# Patient Record
Sex: MALE | Race: BLACK OR AFRICAN AMERICAN
[De-identification: names, ages, dates, MRNs, and addresses within clinical notes are randomized per-mention and may not be internally consistent; named-entity substitution may affect disease eponyms.]

---

## 2019-04-04 NOTE — NM
EXAMINATION TYPE: NM thyroid image w uptake

 

DATE OF EXAM: 4/4/2019

 

COMPARISON: None

 

HISTORY: Hyperthyroidism

 

TECHNIQUE:  Thyroid iodine uptake is calculated and images performed after the oral administration of
 292 uCi 1-123 Capsule.

 

FINDINGS: There is normal distribution of activity throughout the gland.  The 4 hour iodine uptake is
 calculated at 33.5% (normal range 8-14%). The 24-hour iodine uptake is calculated at 55.7% (normal r
pooja 15-35%).  

 

There is increased thyroid uptake.

 

IMPRESSION: Findings compatible with thyroiditis

## 2021-04-15 ENCOUNTER — HOSPITAL ENCOUNTER (OUTPATIENT)
Dept: HOSPITAL 47 - ORWHC2ENDO | Age: 56
Discharge: HOME | End: 2021-04-15
Attending: SURGERY
Payer: COMMERCIAL

## 2021-04-15 VITALS — HEART RATE: 65 BPM | DIASTOLIC BLOOD PRESSURE: 68 MMHG | SYSTOLIC BLOOD PRESSURE: 114 MMHG

## 2021-04-15 VITALS — BODY MASS INDEX: 28.7 KG/M2

## 2021-04-15 VITALS — RESPIRATION RATE: 18 BRPM

## 2021-04-15 VITALS — TEMPERATURE: 98.3 F

## 2021-04-15 DIAGNOSIS — I10: ICD-10-CM

## 2021-04-15 DIAGNOSIS — K64.8: ICD-10-CM

## 2021-04-15 DIAGNOSIS — K64.4: ICD-10-CM

## 2021-04-15 DIAGNOSIS — E78.5: ICD-10-CM

## 2021-04-15 DIAGNOSIS — Z79.899: ICD-10-CM

## 2021-04-15 DIAGNOSIS — E07.9: ICD-10-CM

## 2021-04-15 DIAGNOSIS — Z79.82: ICD-10-CM

## 2021-04-15 DIAGNOSIS — K57.30: Primary | ICD-10-CM

## 2021-04-15 PROCEDURE — 45378 DIAGNOSTIC COLONOSCOPY: CPT

## 2021-04-15 NOTE — P.GSHP
History of Present Illness


H&P Date: 04/15/21


Chief Complaint: GI bleed





Is a 55-year-old male presents today for colonoscopy.  He has had issues with GI

bleed.





Past Medical History


Past Medical History: GI Bleed, Hyperlipidemia, Hypertension, Thyroid Disorder


Additional Past Medical History / Comment(s): diverticulitis


History of Any Multi-Drug Resistant Organisms: None Reported


Past Surgical History: No Surgical Hx Reported


Additional Past Surgical History / Comment(s): colonoscopy.  wisdom teeth, root 

canal


Past Anesthesia/Blood Transfusion Reactions: No Reported Reaction, Motion 

Sickness


Smoking Status: Never smoker





Medications and Allergies


                                Home Medications











 Medication  Instructions  Recorded  Confirmed  Type


 


Aspirin [Adult Low Dose Aspirin EC] 81 mg PO DAILY 04/13/21 04/15/21 History


 


Atorvastatin [Lipitor] 10 mg PO DAILY 04/13/21 04/15/21 History


 


Cinnamon Bark [Cinnamon] 1,000 mg PO DAILY 04/13/21 04/15/21 History


 


Ergocalciferol [Vitamin D2 (1250 1,250 mcg PO Q30D 04/13/21 04/15/21 History





Mcg = 44980 Iu)]    


 


Multivitamins, Thera [Multivitamin 1 tab PO DAILY 04/13/21 04/15/21 History





(formulary)]    


 


Verapamil HCl [Verapamil ER] 240 mg PO BID 04/13/21 04/15/21 History


 


lisinopriL 20 mg PO DAILY 04/13/21 04/15/21 History


 


methIMAzole [Methimazole] 10 - 20 mg PO DAILY 04/13/21 04/15/21 History








                                    Allergies











Allergy/AdvReac Type Severity Reaction Status Date / Time


 


No Known Allergies Allergy   Verified 04/15/21 09:47














Surgical - Exam


                                   Vital Signs











Temp Pulse Resp BP Pulse Ox


 


 98.3 F   79   16   153/88   97 


 


 04/15/21 09:41  04/15/21 09:41  04/15/21 09:41  04/15/21 09:41  04/15/21 09:41














- General


well developed, well nourished, no distress





- Eyes


PERRL





- ENT


normal pinna





- Neck


no masses





- Respiratory


normal expansion





- Cardiovascular


Rhythm: regular





- Abdomen


Abdomen: soft, non tender





Assessment and Plan


Assessment: 


GI bleed.  We'll perform colonoscopy.

## 2021-04-15 NOTE — P.OP
Date of Procedure: 04/15/21


Preoperative Diagnosis: 


GI bleed


Postoperative Diagnosis: 


Internal and external hemorrhoids





Diverticulosis


Procedure(s) Performed: 


Colonoscopy


Anesthesia: MAC


Surgeon: Deandre Garcia


Pathology: none sent


Condition: stable


Disposition: PACU


Description of Procedure: 


The patient's placed on the endoscopy table in the lateral position.  He 

received IV sedation.  Digital rectal exam was performed which revealed external

and internal hemorrhoids.  The flexible colonoscope was then placed patient anus

and passed throughout the entire colon.  The ileocecal valve was visualized.  

The cecum, ascending and transverse colon appeared normal.  In the descending 

and sigmoid colon there was moderate diverticulosis.  Scope was then brought 

back the rectum and this appeared normal.  Scope was withdrawn anus and internal

and external hemorrhoids were noted.  There is no evidence of GI bleed.  It is 

presumed that his rectal bleeding was due to hemorrhoids.

## 2021-05-06 ENCOUNTER — HOSPITAL ENCOUNTER (OUTPATIENT)
Dept: HOSPITAL 47 - SLEEP | Age: 56
End: 2021-05-06
Attending: INTERNAL MEDICINE
Payer: COMMERCIAL

## 2021-05-06 DIAGNOSIS — E05.90: ICD-10-CM

## 2021-05-06 DIAGNOSIS — E78.5: ICD-10-CM

## 2021-05-06 DIAGNOSIS — E66.9: ICD-10-CM

## 2021-05-06 DIAGNOSIS — Z79.899: ICD-10-CM

## 2021-05-06 DIAGNOSIS — G47.33: Primary | ICD-10-CM

## 2021-05-06 DIAGNOSIS — I10: ICD-10-CM

## 2021-05-06 DIAGNOSIS — M47.812: ICD-10-CM

## 2021-05-06 PROCEDURE — 99211 OFF/OP EST MAY X REQ PHY/QHP: CPT

## 2021-05-06 NOTE — CONS
CONSULTATION



DATE OF SERVICE:

05/06/2021



This 55-year-old gentleman has been evaluated in Sleep Center for obstructive sleep

apnea-hypopnea syndrome.



HISTORY OF PRESENT ILLNESS/SLEEP-WAKE EVALUATION:

The patient has a history of obstructive sleep apnea since 2005.  He was diagnosed with

obstructive sleep apnea in another institution.  At present he continues to use his

machine every night for the whole night.  He has occasional episodes of snoring during

sleep.  Otherwise he feels fine.



His sleep schedule is from 9 p.m. to 3 or 4 a.m. and from 10 p.m. to 4 or 5 a.m. on

weekends.  No problems with falling asleep.  No TV in bedroom.  Patient sleeps in

different position, wakes up from sleep up to one time with nocturia.



No history of hypnagogic hallucinations, sleep paralysis or cataplexy.  [place]

Sleepiness Scale is 4, which is normal.



I checked his CPAP unit.  CPAP pressure is 7 cm of water. Usage is 30/30 nights for

more than 4 hours, average 8.9 hours per night.  Leak is 2 L/minute, which is

absolutely perfect.  Apnea-hypopnea index is 4.1, which is normal.



The patient is using medium-sized nasal pillows.



PAST MEDICAL HISTORY:

Positive for hypertension, hyperlipidemia, neck arthritis, hyperthyroidism,



PAST SURGICAL HISTORY:

[place] teeth removed.  Colonoscopy.



MEDICATIONS:

1. Verapamil 240 mg twice a day.

2. Lisinopril 20 mg once a day.

3. Methimazole 10 mg once a day.

4. Atorvastatin 10 mg once a day.

5. Vitamin D supplement.



SOCIAL HISTORY:

Negative for smoking or using alcohol.



FAMILY HISTORY:

Positive for cancer.



REVIEW OF SYSTEMS:

No fevers.  No double vision.  No recent chest pain.  No shortness of breath.  No

abdominal pain.  No bleeding episodes.  No blood in the urine.  No seizure episodes.



Occasional snoring with CPAP.



PHYSICAL EXAMINATION:

GENERAL: A pleasant  gentleman without distress.

VITAL SIGNS: /84, HR 61, RR 12, height 5 feet 9-1/2 inches, weight 220.0,

temperature 97.3, BMI 32.0. Oxygen saturation at room air 98%.

HEENT: PERRLA, EOMI. Evaluation of oropharynx showed tongue protrudes midline. Low

position of soft palate. Mallampati III.

NECK: Supple. No JVD.  Thyroid is not palpable. Neck measures 17-1/2 inches in

circumference.

LUNGS: Clear to percussion and to auscultation.  Good air exchange.  No wheezing or

rhonchi.

HEART: S1, S2 regular.  No murmurs, gallops or rubs.

ABDOMEN:  Soft and nontender.  Bowel sounds are present.  No organomegaly appreciated.

EXTREMITIES:  No clubbing or cyanosis.

CNS: Awake, alert, and oriented X3.  Cranial nerves 2 to 7 intact.  There is no

fasciculation or atrophy. noted.  No focal deficits observed.



IMPRESSION:

1. Obstructive sleep apnea-hypopnea syndrome.  Patient demonstrated 100% compliance

    with treatment.  Normal apnea-hypopnea index reading from CPAP unit.

2. Mild obesity.

3. Hypertension.

4. Hyperlipidemia.

5. Neck arthritis.

6. Hyperthyroidism.



PLAN:

1. I wrote a prescription for all necessary CPAP supplies, including nasal pillow

    mask, tube, filters.

2. Patient should continue to use CPAP equipment every night for the whole night.

3. Watching and losing weight.

4. Sleep hygiene with regular time in bed for 7-1/2 to 8 hours.

5. No driving if feeling any sleepiness.

6. Follow-up visit in 6 months or earlier if patient has any problems.

Thank you very much for allowing me to participate in the management of your patient.



Sincerely,







Mitesh Boyer MD, PhD, FAASM

Diplomat of American Board of Medical Specialties

American Board of Internal Medicine

Medical Director of [facility]





MMODL / MARTINN: [id number] / Job#: [id number]

## 2021-12-09 ENCOUNTER — HOSPITAL ENCOUNTER (OUTPATIENT)
Dept: HOSPITAL 47 - SLEEP | Age: 56
End: 2021-12-09
Attending: INTERNAL MEDICINE
Payer: COMMERCIAL

## 2021-12-09 DIAGNOSIS — G47.33: Primary | ICD-10-CM

## 2021-12-09 DIAGNOSIS — E66.9: ICD-10-CM

## 2021-12-09 DIAGNOSIS — E78.5: ICD-10-CM

## 2021-12-09 DIAGNOSIS — I10: ICD-10-CM

## 2021-12-09 DIAGNOSIS — Z79.899: ICD-10-CM

## 2021-12-09 DIAGNOSIS — E21.3: ICD-10-CM

## 2021-12-09 DIAGNOSIS — Z87.39: ICD-10-CM

## 2021-12-09 DIAGNOSIS — Z99.89: ICD-10-CM

## 2021-12-09 NOTE — SFUN
SLEEP CENTER FOLLOW UP NOTE



DATE OF SERVICE:

12/09/2021



55-year-old gentleman has been followed in Sleep Center for treatment of obstructive

sleep apnea-hypopnea syndrome.  During previous visit, I wrote him a prescription for

all necessary CPAP supplies.  He is getting his supplies in time.  No problems with

that now. Using his machine every night.  He likes machine, likes his mask.  [place]

Sleepiness Scale today is 3.



I checked his CPAP unit. Pressure is 7 cm of water. Usage is 100% of nights more than 4

hours.  Average 9.9 hours per night. Leak is 7 L/minute which is normal range.  Apnea-

hypopnea index is 3.1 which is normal.



MEDICATIONS:

Verapamil 240 mg twice a day, lisinopril 20 mg once a day, atorvastatin 10 mg once a

day, _____10 mg once a day, vitamin D2 supplement.



PHYSICAL EXAMINATION:

GENERAL: Pleasant  gentleman without distress.

/80, HR 72, RR 15, height 5 feet 9-1/2 inches, weight 216.8 pounds, temperature

97, oxygen saturation on room air 98%.

Oropharynx: Low position of soft palate, Mallampati 3.

NECK:  Supple, no JVD.  Thyroid is not palpable.

LUNGS:  Clear to percussion and to auscultation.  Good air exchange.  No wheezing or

rhonchi.

HEART:  S1, S2 regular.  No murmurs, gallops, or rubs.

ABDOMEN:  Soft and nontender.  Bowel sounds are present.  No organomegaly appreciated.

EXTREMITIES: No clubbing or cyanosis.

CNS:  Awake, alert, and oriented X3.  Cranial nerves 2 to 7 intact.  There is no

fasciculation or atrophy. noted.  No focal deficits observed.



IMPRESSION:

1. Obstructive sleep apnea-hypopnea syndrome patient demonstrated great compliance

    with treatment, benefitting from treatment.

2. Hypertension.

3. Mild obesity, BMI 31.7.

4. Hyperlipidemia.

5. History of neck arthritis.

6. Hyperthyroidism.



PLAN:

1. Patient will continue to use PAP equipment every night for the whole night.

2. Sleep hygiene with regular time in bed for at least 7-1/2 to 8 hours.

3. Precautions related to driving. No driving if feeling sleepiness.

4. I will maintain all necessary prescription for PAP supplies including mask, tube,

    filters.

5. Watching weight.

6. Follow-up visit in 6 months or earlier if patient has any problems.



Thank you very much for allowing me to participate in management of your patient.



Sincerely,









Mitesh Boeyr MD, PhD, FAASM

Diplomat of American Board of Medical Specialties

Sleep Medicine Board of American Board of Internal Medicine

Medical Director of [facility]





CORRINE / DAMI: [id number] / Job#: [id number]

## 2023-07-05 ENCOUNTER — HOSPITAL ENCOUNTER (OUTPATIENT)
Dept: HOSPITAL 47 - 3 N SLEEP | Age: 58
End: 2023-07-05
Payer: COMMERCIAL

## 2023-07-05 DIAGNOSIS — Z87.39: ICD-10-CM

## 2023-07-05 DIAGNOSIS — G47.33: Primary | ICD-10-CM

## 2023-07-05 DIAGNOSIS — E03.9: ICD-10-CM

## 2023-07-05 DIAGNOSIS — Z79.899: ICD-10-CM

## 2023-07-05 DIAGNOSIS — E78.5: ICD-10-CM

## 2023-07-05 DIAGNOSIS — E66.9: ICD-10-CM

## 2023-07-05 DIAGNOSIS — Z99.89: ICD-10-CM

## 2023-07-05 DIAGNOSIS — I10: ICD-10-CM

## 2023-07-05 PROCEDURE — 99212 OFFICE O/P EST SF 10 MIN: CPT

## 2023-07-05 NOTE — P.PN
Subjective


DATE: 07/05/2023





FOLLOW UP VISIT.





Patient with obstructive sleep apnea hypopnea syndrome return to sleep center 

for follow-up visit. 


Information from previous visit have been reviewed.  


 Patient is using PAP equipment every night for the whole night, getting PAP 

supplies in time.


The patient does not have significant problems with the mask, PAP unit and 

humidification. [place] sleepiness scale is 4, which is normal.


I checked information from PAP unit. 


PAP unit pressure 7 cm H2O. 


Usage is 100 % for more then 4 hours, average 8.9 hours per night. 


Leak is 17 l/m, which is in acceptable range. 


Apnea Hypopnea Index is 3.3, which is normal.  





MEDICATIONS:1..  Verapamil 240 mg twice a day


                          2.  Lisinopril 20 mg once a day


                          3.  Methimazole 10 mg once a day


                          4.  Atorvastatin 10 mg once a day


                          


During physical exam:


GENERAL: A pleasant patient without any distress. 


VITAL SIGNS: /76, HR 89, RR16 , weight 216.8, temperature 98.0, oxygen 

saturation at room air 96 % .


HEENT: PERRLA, EOMI.low position of soft palate, Mallapati 3 .


NECK: Supple. No JVD. 


LUNGS: Clear to percussion and to auscultation. Good air exchange. No wheezing 

or rhonchi. 


HEART: S1, S2 regular. 


ABDOMEN: Soft and nontender.[]  


EXTREMITIES: No clubbing or cyanosis. 


CNS: Awake, alert, and oriented x3.  No focal deficit. 





Impressions:


1.  Obstructive sleep apnea-hypopnea syndrome. Patient demonstrated great 

compliance with treatment, benefiting from treatment.


2.  Hypertension.


3.   Mild obesity, BMI in the range of 32.


4.  Hypothyroidism.


5.   Hyperlipidemia.


6.   History of neck arthritis.





Plan:


1.  Continue using PAP equipment every night for the whole night.


2.  To change air filter at least 1-2 times per month.


3.  PAP unit should stay lower then position of the head.


4.  Advised patient to remove all remaining water from humidifier canister daily

 and make it dry after each usage. Refill canister with fresh distilled water 

before each usage. 


5.  Sleep hygiene with regular time in bed for at least 8 hours.


6.  Precautions related to driving. No driving if feel any sleepiness.


7.  I will maintain prescription for PAP supplies including mask, tube, filters.


8. Watching weight.


9. Follow up visit in 6 months or earlier if patient has any problems.








Thank you very much for allowing me to participate in the management of your 

patient.








Mitesh Boyer MD, PhD, FAASM.


Diplomat of American Board of Sleep Medicine,


Sleep Medicine Board by American Board of Internal Medicine


Medical Director of [facility]

## 2023-10-09 ENCOUNTER — HOSPITAL ENCOUNTER (OUTPATIENT)
Dept: HOSPITAL 47 - OR | Age: 58
Discharge: HOME | End: 2023-10-09
Attending: SURGERY
Payer: COMMERCIAL

## 2023-10-09 VITALS — TEMPERATURE: 98 F

## 2023-10-09 VITALS — DIASTOLIC BLOOD PRESSURE: 78 MMHG | HEART RATE: 53 BPM | SYSTOLIC BLOOD PRESSURE: 126 MMHG

## 2023-10-09 VITALS — RESPIRATION RATE: 16 BRPM

## 2023-10-09 DIAGNOSIS — Z79.899: ICD-10-CM

## 2023-10-09 DIAGNOSIS — Z79.82: ICD-10-CM

## 2023-10-09 DIAGNOSIS — E78.5: ICD-10-CM

## 2023-10-09 DIAGNOSIS — K42.0: Primary | ICD-10-CM

## 2023-10-09 DIAGNOSIS — G47.33: ICD-10-CM

## 2023-10-09 DIAGNOSIS — I10: ICD-10-CM

## 2023-10-09 DIAGNOSIS — Z87.19: ICD-10-CM

## 2023-10-09 DIAGNOSIS — E07.9: ICD-10-CM

## 2023-10-09 PROCEDURE — 49592 RPR AA HRN 1ST < 3 NCR/STRN: CPT

## 2023-10-09 NOTE — P.OP
Date of Procedure: 10/09/23


Procedure(s) Performed: 


PREOPERATIVE DIAGNOSIS:  Incarcerated umbilical hernia


POSTOPERATIVE DIAGNOSIS:  Same


PROCEDURE: Open repair incarcerated umbilical hernia with mesh


SURGEON: Dr. Calhoun


ANESTHESIA: General


OPERATIVE PROCEDURE DETAILS:  The patient was placed in the operating table in 

the supine position.  A right sided periumbilical incision was made using the 

scalpel.  The subcutaneous tissues were dissected bluntly and with cautery.  The

hernia sac was identified.  The umbilical attachments to the fascia were divided

using electrocautery.  The hernia sac was excised.  The defect in the fascia 

measured 2.5 x 1.5 cm  The fat overlying the fascia was dissected.  No 

additional defects were seen.  The preperitoneal space was then dissected using 

blunt dissection and electrocautery.  The 6.4 cm ventral ex mesh was placed 

beneath the fascia and sutured in place using trans-fascial 0 Ethibond sutures. 

The defect was closed using interrupted vest over pants 0 Ethibond mattress 

sutures.  The subcutaneous tissues were reapproximated using inverted 2-0 & 3-0 

Vicryl sutures.  The umbilicus was tacked back down to the fascia using a 2-0 

Vicryl suture.  The skin was closed using 4-0 Monocryl sutures.  Skin glue and 

sterile dressings were then applied.


HERNIA CHARACTERISTICS:


   Length:  2.5 cm


   Width:  1.5 cm


   Type:  Incarcerated umbilical


TYPE OF MESH USED:  6.4 cm ventral ex


LOCATION OF MESH:  sublay


FIXATION:  0 Ethibond


PREOPERATIVE DISCUSSION ON SMOKING CESSASTION: Yes


PREOPERATIVE DISCUSSION ON MORBID OBESITY: Yes


PREOPERATIVE DISCUSSION ON APPROPRIATE USE OF NARCOTIC USE: Yes


PREOPERATIVE EDUCATION: Multi Modal, Smoking Cessation and Weight Loss with BMI 

over 35. 


DISPOSITION:  Stable to recovery room

## 2023-10-09 NOTE — P.GSHP
History of Present Illness


H&P Date: 10/09/23


Chief Complaint: Incarcerated umbilical hernia





57-year-old male here today for repair umbilical hernia.  Patient seen in the 

office in August.  Bulge at the umbilicus gradually increasing in size.  Mild 

pain.  No tobacco use.





Past Medical History


Past Medical History: GI Bleed, Hyperlipidemia, Hypertension, Thyroid Disorder


Additional Past Medical History / Comment(s): diverticulitis, hemorrhoids, 

hyperthyroidism


History of Any Multi-Drug Resistant Organisms: None Reported


Past Surgical History: No Surgical Hx Reported


Additional Past Surgical History / Comment(s): colonoscopy, wisdom teeth


Past Anesthesia/Blood Transfusion Reactions: Motion Sickness


Smoking Status: Never smoker





- Past Family History


  ** Father


Family Medical History: Cancer, CVA/TIA, Myocardial Infarction (MI), Thyroid 

Disorder


Additional Family Medical History / Comment(s): Smoker,  of cva





  ** Mother


Family Medical History: Diabetes Mellitus





Medications and Allergies


                                Home Medications











 Medication  Instructions  Recorded  Confirmed  Type


 


Aspirin [Adult Low Dose Aspirin EC] 81 mg PO QAM 04/13/21 10/05/23 History


 


Atorvastatin [Lipitor] 10 mg PO HS 04/13/21 10/09/23 History


 


Cinnamon Bark [Cinnamon] 1,000 mg PO HS 04/13/21 10/05/23 History


 


Ergocalciferol [Vitamin D2 (1250 1,250 mcg PO Q30D 04/13/21 10/09/23 History





Mcg = 23407 Iu)]    


 


Multivitamins, Thera [Multivitamin 1 tab PO QAM 04/13/21 10/05/23 History





(formulary)]    


 


Verapamil HCl [Verapamil ER] 240 mg PO BID 04/13/21 10/09/23 History


 


lisinopriL [Prinivil] 20 mg PO QAM 04/13/21 10/09/23 History


 


methIMAzole [Methimazole] 10 mg PO QAM 04/13/21 10/09/23 History


 


Biotin 2,500 mcg PO QAM 10/05/23 10/05/23 History


 


Fish Oil/Dha/Epa [Fish Oil 1,200 1 each PO QAM 10/05/23 10/05/23 History





mg Fish Oil]    








                                    Allergies











Allergy/AdvReac Type Severity Reaction Status Date / Time


 


No Known Allergies Allergy   Verified 10/09/23 06:45














Surgical - Exam


                                   Vital Signs











Temp Pulse Resp BP Pulse Ox


 


 97.1 F L  59 L  16   138/78   97 


 


 10/09/23 06:47  10/09/23 06:47  10/09/23 06:47  10/09/23 06:47  10/09/23 06:47

















Physical exam:


General: Well-developed, well-nourished


HEENT: Normocephalic, sclerae nonicteric


Abdomen: Nontender, nondistended, incarcerated umbilical hernia


Extremities: No edema


Neuro: Alert and oriented





Assessment and Plan


(1) Umbilical hernia


Narrative/Plan: 


57-year-old male with symptomatic incarcerated umbilical hernia.  We'll proceed 

with open repair umbilical hernia with mesh.  Risks of bleeding, infection, 

recurrence, bladder and bowel injury, numbness, nerve injury were discussed with

the patient.  The patient understands and wishes to proceed.


Current Visit: Yes   Status: Acute   Code(s): K42.9 - UMBILICAL HERNIA WITHOUT 

OBSTRUCTION OR GANGRENE   SNOMED Code(s): 782131709

## 2024-01-03 NOTE — P.PN
Subjective


DATE: 01/03/2024





FOLLOW UP VISIT.





Patient with obstructive sleep apnea hypopnea syndrome return to sleep center 

for follow-up visit. 


Information from previous visit have been reviewed.  


 Patient is using PAP equipment every night for the whole night, getting PAP 

supplies in time.


The patient does not have significant problems with the mask, PAP unit and 

humidification. [place] sleepiness scale is 3.


I checked information from PAP unit.  Pap unit has signed visit motor life 

expectancy exceeded.


PAP unit pressure 7 cm H2O. 


Usage is 98 % for more then 4 hours, average 8.7 hours per night. 


Leak is 17 l/m, which is in acceptable range. 


Apnea Hypopnea Index is 2.1, which is normal.  





MEDICATIONS:1.  Lisinopril 20 mg once a day


                          2..  Verapamil 240 mg twice a day


                          3.  Atorvastatin 10 mg once a day


                          4.  Methimazole 10 mg once a day


                          


During physical exam:


GENERAL: A pleasant patient without any distress. 


VITAL SIGNS: /93, HR 76, RR 16, weight 210, temperature 98.1, oxygen 

saturation at room air 98 % .


HEENT: PERRLA, EOMI.low position of soft palate, Mallapati 3 .


NECK: Supple. No JVD. 


LUNGS: Clear to percussion and to auscultation. Good air exchange. No wheezing 

or rhonchi. 


HEART: S1, S2 regular. 


ABDOMEN: Soft and nontender.[]  


EXTREMITIES: No clubbing or cyanosis. 


CNS: Awake, alert, and oriented x3.  No focal deficit. 





Impressions:


1.  Obstructive sleep apnea-hypopnea syndrome. Patient demonstrated great 

compliance with treatment, benefiting from treatment.  CPAP unit is old, motor 

life expectancy exceeded.


2.  Hypertension.


3.  Hyperlipidemia.


4.  Mild obesity, BMI 31.9.


5.  Hypothyroidism.


6.  History of neck arthritis.











Plan:


1.  Continue using PAP equipment every night for the whole night.  Prescription 

to replace CPAP unit.


2.  To change air filter at least 1-2 times per month.


3.  PAP unit should stay lower then position of the head.


4.  Advised patient to remove all remaining water from humidifier canister daily

 and make it dry after each usage. Refill canister with fresh distilled water 

before each usage. 


5.  Sleep hygiene with regular time in bed for at least 8 hours.


6.  Precautions related to driving. No driving if feel any sleepiness.


7.  I will maintain prescription for PAP supplies including mask, tube, filters.


8. Follow up visit in 1-3 months after patient will get new CPAP unit to 

document compliance with treatment, clinical response on treatment and to make 

any necessary adjustments.


9. Watching weight.








Thank you very much for allowing me to participate in the management of your 

patient.








Mitesh Boyer MD, PhD, FAASM.


Diplomat of American Board of Sleep Medicine,


Sleep Medicine Board by American Board of Internal Medicine


Medical Director of [facility] [place]